# Patient Record
Sex: MALE
[De-identification: names, ages, dates, MRNs, and addresses within clinical notes are randomized per-mention and may not be internally consistent; named-entity substitution may affect disease eponyms.]

---

## 2021-01-25 PROBLEM — Z00.00 ENCOUNTER FOR PREVENTIVE HEALTH EXAMINATION: Status: ACTIVE | Noted: 2021-01-25

## 2021-01-26 ENCOUNTER — APPOINTMENT (OUTPATIENT)
Dept: SURGICAL ONCOLOGY | Facility: CLINIC | Age: 71
End: 2021-01-26
Payer: MEDICARE

## 2021-01-26 VITALS
HEIGHT: 72 IN | BODY MASS INDEX: 36.84 KG/M2 | OXYGEN SATURATION: 96 % | WEIGHT: 272 LBS | DIASTOLIC BLOOD PRESSURE: 84 MMHG | HEART RATE: 89 BPM | SYSTOLIC BLOOD PRESSURE: 190 MMHG

## 2021-01-26 DIAGNOSIS — Z78.9 OTHER SPECIFIED HEALTH STATUS: ICD-10-CM

## 2021-01-26 DIAGNOSIS — N40.0 BENIGN PROSTATIC HYPERPLASIA WITHOUT LOWER URINARY TRACT SYMPMS: ICD-10-CM

## 2021-01-26 DIAGNOSIS — Z87.39 PERSONAL HISTORY OF OTHER DISEASES OF THE MUSCULOSKELETAL SYSTEM AND CONNECTIVE TISSUE: ICD-10-CM

## 2021-01-26 DIAGNOSIS — Z80.7 FAMILY HISTORY OF OTHER MALIGNANT NEOPLASMS OF LYMPHOID, HEMATOPOIETIC AND RELATED TISSUES: ICD-10-CM

## 2021-01-26 PROCEDURE — 99072 ADDL SUPL MATRL&STAF TM PHE: CPT

## 2021-01-26 PROCEDURE — 99205 OFFICE O/P NEW HI 60 MIN: CPT

## 2021-01-26 NOTE — CONSULT LETTER
[Dear  ___] : Dear  [unfilled], [Consult Letter:] : I had the pleasure of evaluating your patient, [unfilled]. [Please see my note below.] : Please see my note below. [Consult Closing:] : Thank you very much for allowing me to participate in the care of this patient.  If you have any questions, please do not hesitate to contact me. [Sincerely,] : Sincerely, [FreeTextEntry2] : Shelton Aristeo, DO [FreeTextEntry1] : I will keep you informed of the final pathology. [FreeTextEntry3] : Esteban Villaseñor MD FACS\par Chief of Surgical Oncology\par \par  [DrEnzo  ___] : Dr. MANN

## 2021-01-26 NOTE — HISTORY OF PRESENT ILLNESS
[de-identified] : Toy Flores is a 70 year old male who presents today for initial consultation. He was referred by Dr. Deya Alberts. 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 \par \par He has never had any previous skin cancers.\par He does have a hx. of a lot of sun exposure.\par \par \par His past medical history includes BPH and osteoarthritis. He consumes alcohol socially. No personal history of cancer. Family history of cancer, son (non-Hodgkin's lymphoma). \par \par Referring MD: Deya Alberts, DO

## 2021-01-26 NOTE — PHYSICAL EXAM
[Normal] : supple, no neck mass and thyroid not enlarged [Normal Neck Lymph Nodes] : normal neck lymph nodes  [Normal Supraclavicular Lymph Nodes] : normal supraclavicular lymph nodes [Normal Axillary Lymph Nodes] : normal axillary lymph nodes [Normal] : oriented to person, place and time, with appropriate affect [de-identified] : biopsy site medial aspect of right scapula( picture taken )

## 2021-01-26 NOTE — ASSESSMENT
[FreeTextEntry1] : IMP: \par 71 y/o melanoma in situ of the right medial scapula area\par \par \par PLAN:\par Wide excision of right medial scapula melanoma in situ

## 2021-02-05 ENCOUNTER — TRANSCRIPTION ENCOUNTER (OUTPATIENT)
Age: 71
End: 2021-02-05

## 2021-02-05 ENCOUNTER — OUTPATIENT (OUTPATIENT)
Dept: OUTPATIENT SERVICES | Facility: HOSPITAL | Age: 71
LOS: 1 days | End: 2021-02-05
Payer: MEDICARE

## 2021-02-05 ENCOUNTER — RESULT REVIEW (OUTPATIENT)
Age: 71
End: 2021-02-05

## 2021-02-05 DIAGNOSIS — D03.61 MELANOMA IN SITU OF RIGHT UPPER LIMB, INCLUDING SHOULDER: ICD-10-CM

## 2021-02-05 PROCEDURE — 88321 CONSLTJ&REPRT SLD PREP ELSWR: CPT

## 2021-02-08 LAB — SURGICAL PATHOLOGY STUDY: SIGNIFICANT CHANGE UP

## 2021-02-09 ENCOUNTER — LABORATORY RESULT (OUTPATIENT)
Age: 71
End: 2021-02-09

## 2021-02-10 ENCOUNTER — APPOINTMENT (OUTPATIENT)
Dept: SURGICAL ONCOLOGY | Facility: CLINIC | Age: 71
End: 2021-02-10
Payer: MEDICARE

## 2021-02-10 PROCEDURE — 21935 RESECT BACK TUM < 5 CM: CPT

## 2021-02-10 PROCEDURE — 14000 TIS TRNFR TRUNK 10 SQ CM/<: CPT

## 2021-02-10 PROCEDURE — 99072 ADDL SUPL MATRL&STAF TM PHE: CPT

## 2021-02-10 NOTE — PROCEDURE
[Excision Of Lesion] : excision of lesion [Melanoma In Situ Suspected] : melanoma in situ suspected [Patient] : patient [Risks] : risks [___ ml Inj] : [unfilled] ~Uml [1%] : 1% [With Epi] : with epinephrine [Betadine] : using Betadine [Excisional: Margin ___mm] : the lesion was excised with a  [unfilled] ~Umm margin in an elliptical fashion [Simple Sutures] : simple sutures were used for the skin closure [Vicryl] : Vicryl suture(s) [___ # of Sutures] : [unfilled] [Size: ___-0] : [unfilled]-0 [Interrupted] : interrupted [Nylon] : nylon suture(s) [Polysporin Ointment] : Polysporin ointment was applied [Sent to Pathology] : the excised lesion was place in buffered formalin and sent for pathology [Tolerated Well] : the patient tolerated the procedure well [No Complications] : there were no complications [___ Week(s)] : in [unfilled] week(s) [FreeTextEntry5] : right upper back

## 2021-02-10 NOTE — ASSESSMENT
[FreeTextEntry1] : melanoma in-situ excised from right upper back ( 3cm by 1.5 cm ) and closed with adjacent tissue transfer\par Plan:\par check pathology\par RTO 2 weeks for suture removal

## 2021-02-25 ENCOUNTER — APPOINTMENT (OUTPATIENT)
Dept: SURGICAL ONCOLOGY | Facility: CLINIC | Age: 71
End: 2021-02-25
Payer: MEDICARE

## 2021-02-25 VITALS
SYSTOLIC BLOOD PRESSURE: 192 MMHG | WEIGHT: 270 LBS | OXYGEN SATURATION: 96 % | HEART RATE: 74 BPM | DIASTOLIC BLOOD PRESSURE: 93 MMHG | HEIGHT: 72 IN | TEMPERATURE: 98.1 F | BODY MASS INDEX: 36.57 KG/M2 | RESPIRATION RATE: 14 BRPM

## 2021-02-25 PROCEDURE — 99024 POSTOP FOLLOW-UP VISIT: CPT

## 2021-02-25 NOTE — CONSULT LETTER
[Dear  ___] : Dear  [unfilled], [Courtesy Letter:] : I had the pleasure of seeing your patient, [unfilled], in my office today. [Please see my note below.] : Please see my note below. [Consult Closing:] : Thank you very much for allowing me to participate in the care of this patient.  If you have any questions, please do not hesitate to contact me. [Sincerely,] : Sincerely, [FreeTextEntry1] : He will follow-up with you fro total body skin exams. [FreeTextEntry3] : Esteban Villaseñor MD FACS\par Chief of Surgical Oncology\par \par  [DrEnzo  ___] : Dr. MANN

## 2021-02-25 NOTE — ASSESSMENT
[FreeTextEntry1] : IMP: \par Status post wide excision of melanoma in situ from the right upper back on 2/10/21.  \par Final pathology demonstrated scar with no residual melanoma, incidental junctional melanocytic nevus with architectural disorder and mild atypia of the melanocytes, completed excised- negative margins. \par \par \par PLAN:\par Continue dermatologic surveillance with Dr. Alberts\par RTO 3 months

## 2021-02-25 NOTE — REASON FOR VISIT
[FreeTextEntry2] : status post wide excision of melanoma in situ from the right upper back on 2/10/21

## 2021-02-25 NOTE — HISTORY OF PRESENT ILLNESS
[de-identified] : Toy Flores is a 70 year old male who presents today for initial postop visit, status post wide excision of melanoma in situ from the right upper back on 2/10/21.  Final pathology demonstrated scar with no residual melanoma, incidental junctional melanocytic nevus with architectural disorder and mild atypia of the melanocytes, completed excised. Today, he denies pain, fever, or chills. Incision site healing well, stitches removed. \par \par Previous history:\par \par He was initially seen in consultation on 1/26/21, referred by Dr. Deya Alberts. 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 \par \par He has never had any previous skin cancers.\par He does have a hx. of a lot of sun exposure.\par \par \par His past medical history includes BPH and osteoarthritis. He consumes alcohol socially. No personal history of cancer. Family history of cancer, son (non-Hodgkin's lymphoma). \par \par Referring MD: Deya Alberts DO

## 2021-05-28 PROBLEM — D03.59 MELANOMA IN SITU OF BACK: Status: ACTIVE | Noted: 2021-01-26

## 2021-06-03 ENCOUNTER — APPOINTMENT (OUTPATIENT)
Dept: SURGICAL ONCOLOGY | Facility: CLINIC | Age: 71
End: 2021-06-03
Payer: MEDICARE

## 2021-06-03 VITALS
RESPIRATION RATE: 16 BRPM | DIASTOLIC BLOOD PRESSURE: 80 MMHG | HEART RATE: 76 BPM | WEIGHT: 275 LBS | HEIGHT: 72 IN | BODY MASS INDEX: 37.25 KG/M2 | SYSTOLIC BLOOD PRESSURE: 162 MMHG | OXYGEN SATURATION: 96 %

## 2021-06-03 DIAGNOSIS — D03.59 MELANOMA IN SITU OF OTHER PART OF TRUNK: ICD-10-CM

## 2021-06-03 PROCEDURE — 99214 OFFICE O/P EST MOD 30 MIN: CPT

## 2021-06-03 NOTE — REASON FOR VISIT
[Follow-Up Visit] : a follow-up visit for [FreeTextEntry2] : s/p wide excision of melanoma in situ from the right upper back on 2/10/21

## 2021-06-03 NOTE — PHYSICAL EXAM
[Normal] : supple, no neck mass and thyroid not enlarged [Normal Supraclavicular Lymph Nodes] : normal supraclavicular lymph nodes [Normal Axillary Lymph Nodes] : normal axillary lymph nodes [Normal] : oriented to person, place and time, with appropriate affect [de-identified] : back excision site clear

## 2021-06-03 NOTE — ASSESSMENT
[FreeTextEntry1] : IMP: \par Status post wide excision of melanoma in situ from the right upper back on 2/10/21.  \par Final pathology demonstrated scar with no residual melanoma, incidental junctional melanocytic nevus with architectural disorder and mild atypia of the melanocytes, completed excised- negative margins. \par \par \par PLAN:\par Continue dermatologic surveillance with Dr. Alberts\par RTO PRN

## 2021-06-03 NOTE — HISTORY OF PRESENT ILLNESS
[de-identified] : Toy Flores is a 70 year old male who presents today for follow up, patient is status post wide excision of melanoma in situ from the right upper back on 2/10/21.  Final pathology demonstrated scar with no residual melanoma, incidental junctional melanocytic nevus with architectural disorder and mild atypia of the melanocytes, completed excised. Today, he denies pain, fever, or chills. Incision site healing well, stitches removed. \par \par Previous history:\par \par He was initially seen in consultation on 1/26/21, referred by Dr. Deya Alberts. 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 \par \par He has never had any previous skin cancers.\par He does have a hx. of a lot of sun exposure.\par \par \par His past medical history includes BPH and osteoarthritis. He consumes alcohol socially. No personal history of cancer. Family history of cancer, son (non-Hodgkin's lymphoma). \par \par Referring MD: Deya Alberts DO

## 2023-01-23 ENCOUNTER — OFFICE (OUTPATIENT)
Dept: URBAN - METROPOLITAN AREA CLINIC 104 | Facility: CLINIC | Age: 73
Setting detail: OPHTHALMOLOGY
End: 2023-01-23
Payer: COMMERCIAL

## 2023-01-23 VITALS — HEIGHT: 60 IN

## 2023-01-23 DIAGNOSIS — H53.10: ICD-10-CM

## 2023-01-23 DIAGNOSIS — H25.13: ICD-10-CM

## 2023-01-23 PROBLEM — H25.11: Status: ACTIVE | Noted: 2023-01-23

## 2023-01-23 PROBLEM — H25.12: Status: ACTIVE | Noted: 2023-01-23

## 2023-01-23 PROCEDURE — 92014 COMPRE OPH EXAM EST PT 1/>: CPT | Performed by: OPHTHALMOLOGY

## 2023-01-23 ASSESSMENT — VISUAL ACUITY
OS_BCVA: 20/25+1
OD_BCVA: 20/20-1

## 2023-01-23 ASSESSMENT — KERATOMETRY
OD_K2POWER_DIOPTERS: 44.53
OS_AXISANGLE_DEGREES: 112
OD_K1POWER_DIOPTERS: 43.21
OD_AXISANGLE_DEGREES: 042
OS_K1POWER_DIOPTERS: 43.77
OS_K2POWER_DIOPTERS: 44.41

## 2023-01-23 ASSESSMENT — REFRACTION_AUTOREFRACTION
OD_SPHERE: +3.75
OD_AXIS: 107
OD_CYLINDER: -1.00
OS_SPHERE: +3.75
OS_CYLINDER: -2.50
OS_AXIS: 084

## 2023-01-23 ASSESSMENT — CONFRONTATIONAL VISUAL FIELD TEST (CVF)
OS_FINDINGS: FULL
OD_FINDINGS: FULL

## 2023-01-23 ASSESSMENT — AXIALLENGTH_DERIVED
OS_AL: 22.46
OD_AL: 22.27

## 2023-01-23 ASSESSMENT — SPHEQUIV_DERIVED
OD_SPHEQUIV: 3.25
OS_SPHEQUIV: 2.5

## 2023-01-23 ASSESSMENT — REFRACTION_CURRENTRX
OD_ADD: +1.75
OD_AXIS: 088
OS_SPHERE: +3.00
OS_CYLINDER: -0.75
OS_OVR_VA: 20/
OD_SPHERE: +3.50
OD_VPRISM_DIRECTION: PROGS
OS_ADD: +2.50
OS_AXIS: 091
OS_VPRISM_DIRECTION: PROGS
OD_OVR_VA: 20/
OD_CYLINDER: -0.75

## 2023-01-23 ASSESSMENT — TONOMETRY
OS_IOP_MMHG: 15
OD_IOP_MMHG: 15

## 2024-10-30 ENCOUNTER — OFFICE (OUTPATIENT)
Dept: URBAN - METROPOLITAN AREA CLINIC 70 | Facility: CLINIC | Age: 74
Setting detail: OPHTHALMOLOGY
End: 2024-10-30
Payer: MEDICARE

## 2024-10-30 DIAGNOSIS — H25.13: ICD-10-CM

## 2024-10-30 PROCEDURE — 92014 COMPRE OPH EXAM EST PT 1/>: CPT | Performed by: OPHTHALMOLOGY

## 2024-10-30 ASSESSMENT — CONFRONTATIONAL VISUAL FIELD TEST (CVF)
OS_FINDINGS: FULL
OD_FINDINGS: FULL

## 2024-10-30 ASSESSMENT — KERATOMETRY
OD_K1POWER_DIOPTERS: 43.50
OS_AXISANGLE_DEGREES: 076
OS_K1POWER_DIOPTERS: 44.00
OD_K2POWER_DIOPTERS: 44.25
OS_K2POWER_DIOPTERS: 44.25
OD_AXISANGLE_DEGREES: 047

## 2024-10-30 ASSESSMENT — REFRACTION_MANIFEST
OS_AXIS: 085
OS_CYLINDER: -1.25
OS_SPHERE: +3.75
OD_SPHERE: +3.25
OD_ADD: +2.25
OS_ADD: +2.25
OD_VA1: 20/20
OD_VA2: 20/20
OS_VA2: 20/20
OD_AXIS: 110
OS_VA1: 20/20
OD_CYLINDER: +0.75

## 2024-10-30 ASSESSMENT — REFRACTION_AUTOREFRACTION
OS_SPHERE: +3.75
OS_CYLINDER: -1.25
OD_CYLINDER: -0.75
OD_AXIS: 114
OS_AXIS: 084
OD_SPHERE: +3.75

## 2024-10-30 ASSESSMENT — REFRACTION_CURRENTRX
OS_OVR_VA: 20/
OD_ADD: +1.75
OS_VPRISM_DIRECTION: PROGS
OD_SPHERE: +3.50
OS_AXIS: 091
OS_ADD: +2.50
OD_CYLINDER: -0.75
OD_OVR_VA: 20/
OD_AXIS: 088
OS_CYLINDER: -0.75
OD_VPRISM_DIRECTION: PROGS
OS_SPHERE: +3.00

## 2024-10-30 ASSESSMENT — VISUAL ACUITY
OS_BCVA: 20/20
OD_BCVA: 20/20

## 2025-07-16 ENCOUNTER — APPOINTMENT (OUTPATIENT)
Dept: ORTHOPEDIC SURGERY | Facility: CLINIC | Age: 75
End: 2025-07-16
Payer: MEDICARE

## 2025-07-16 VITALS — HEIGHT: 72 IN | WEIGHT: 275 LBS | BODY MASS INDEX: 37.25 KG/M2

## 2025-07-16 PROCEDURE — 73562 X-RAY EXAM OF KNEE 3: CPT | Mod: 50

## 2025-07-16 PROCEDURE — 99204 OFFICE O/P NEW MOD 45 MIN: CPT

## 2025-07-16 RX ORDER — FINASTERIDE 5 MG/1
5 TABLET, FILM COATED ORAL
Refills: 0 | Status: ACTIVE | COMMUNITY

## 2025-09-11 ENCOUNTER — APPOINTMENT (OUTPATIENT)
Dept: ORTHOPEDIC SURGERY | Facility: CLINIC | Age: 75
End: 2025-09-11
Payer: MEDICARE

## 2025-09-11 VITALS — WEIGHT: 270 LBS | BODY MASS INDEX: 36.57 KG/M2 | HEIGHT: 72 IN

## 2025-09-11 DIAGNOSIS — M17.0 BILATERAL PRIMARY OSTEOARTHRITIS OF KNEE: ICD-10-CM

## 2025-09-11 PROCEDURE — 99214 OFFICE O/P EST MOD 30 MIN: CPT
